# Patient Record
Sex: MALE | Race: ASIAN | Employment: UNEMPLOYED | URBAN - METROPOLITAN AREA
[De-identification: names, ages, dates, MRNs, and addresses within clinical notes are randomized per-mention and may not be internally consistent; named-entity substitution may affect disease eponyms.]

---

## 2017-01-11 ENCOUNTER — RECORDS - HEALTHEAST (OUTPATIENT)
Dept: LAB | Facility: CLINIC | Age: 57
End: 2017-01-11

## 2017-01-11 LAB
CHOLEST SERPL-MCNC: 255 MG/DL
FASTING STATUS PATIENT QL REPORTED: YES
HDLC SERPL-MCNC: 46 MG/DL
LDLC SERPL CALC-MCNC: 172 MG/DL
TRIGL SERPL-MCNC: 185 MG/DL

## 2017-04-11 ENCOUNTER — RECORDS - HEALTHEAST (OUTPATIENT)
Dept: LAB | Facility: CLINIC | Age: 57
End: 2017-04-11

## 2017-04-11 LAB
CHOLEST SERPL-MCNC: 277 MG/DL
FASTING STATUS PATIENT QL REPORTED: YES
HDLC SERPL-MCNC: 48 MG/DL
LDLC SERPL CALC-MCNC: 181 MG/DL
TRIGL SERPL-MCNC: 240 MG/DL

## 2018-04-05 ENCOUNTER — RECORDS - HEALTHEAST (OUTPATIENT)
Dept: LAB | Facility: CLINIC | Age: 58
End: 2018-04-05

## 2018-04-05 LAB
ALBUMIN SERPL-MCNC: 3.7 G/DL (ref 3.5–5)
ALP SERPL-CCNC: 91 U/L (ref 45–120)
ALT SERPL W P-5'-P-CCNC: 61 U/L (ref 0–45)
ANION GAP SERPL CALCULATED.3IONS-SCNC: 12 MMOL/L (ref 5–18)
AST SERPL W P-5'-P-CCNC: 29 U/L (ref 0–40)
BILIRUB SERPL-MCNC: 0.7 MG/DL (ref 0–1)
BUN SERPL-MCNC: 14 MG/DL (ref 8–22)
CALCIUM SERPL-MCNC: 9.8 MG/DL (ref 8.5–10.5)
CHLORIDE BLD-SCNC: 106 MMOL/L (ref 98–107)
CHOLEST SERPL-MCNC: 260 MG/DL
CO2 SERPL-SCNC: 22 MMOL/L (ref 22–31)
CREAT SERPL-MCNC: 1.1 MG/DL (ref 0.7–1.3)
FASTING STATUS PATIENT QL REPORTED: YES
GFR SERPL CREATININE-BSD FRML MDRD: >60 ML/MIN/1.73M2
GLUCOSE BLD-MCNC: 102 MG/DL (ref 70–125)
HDLC SERPL-MCNC: 42 MG/DL
LDLC SERPL CALC-MCNC: 161 MG/DL
POTASSIUM BLD-SCNC: 4.5 MMOL/L (ref 3.5–5)
PROT SERPL-MCNC: 7.3 G/DL (ref 6–8)
PSA SERPL-MCNC: 0.6 NG/ML (ref 0–3.5)
SODIUM SERPL-SCNC: 140 MMOL/L (ref 136–145)
TRIGL SERPL-MCNC: 284 MG/DL

## 2018-10-16 ENCOUNTER — RECORDS - HEALTHEAST (OUTPATIENT)
Dept: LAB | Facility: CLINIC | Age: 58
End: 2018-10-16

## 2018-10-16 LAB
CHOLEST SERPL-MCNC: 168 MG/DL
FASTING STATUS PATIENT QL REPORTED: YES
HDLC SERPL-MCNC: 43 MG/DL
LDLC SERPL CALC-MCNC: 109 MG/DL
TRIGL SERPL-MCNC: 80 MG/DL

## 2021-01-01 ENCOUNTER — RECORDS - HEALTHEAST (OUTPATIENT)
Dept: ADMINISTRATIVE | Facility: CLINIC | Age: 61
End: 2021-01-01

## 2022-01-01 ENCOUNTER — APPOINTMENT (OUTPATIENT)
Dept: CT IMAGING | Facility: HOSPITAL | Age: 62
End: 2022-01-01
Attending: EMERGENCY MEDICINE
Payer: MEDICARE

## 2022-01-01 ENCOUNTER — APPOINTMENT (OUTPATIENT)
Dept: GENERAL RADIOLOGY | Facility: CLINIC | Age: 62
DRG: 283 | End: 2022-01-01
Attending: EMERGENCY MEDICINE
Payer: MEDICARE

## 2022-01-01 ENCOUNTER — ANESTHESIA EVENT (OUTPATIENT)
Dept: SURGERY | Facility: CLINIC | Age: 62
DRG: 283 | End: 2022-01-01
Payer: MEDICARE

## 2022-01-01 ENCOUNTER — HOSPITAL ENCOUNTER (EMERGENCY)
Facility: HOSPITAL | Age: 62
Discharge: ANOTHER HOSPITAL | End: 2022-04-01
Attending: EMERGENCY MEDICINE | Admitting: EMERGENCY MEDICINE
Payer: MEDICARE

## 2022-01-01 ENCOUNTER — HOSPITAL ENCOUNTER (INPATIENT)
Facility: CLINIC | Age: 62
LOS: 1 days | DRG: 283 | End: 2022-04-02
Attending: EMERGENCY MEDICINE | Admitting: SURGERY
Payer: MEDICARE

## 2022-01-01 ENCOUNTER — ANESTHESIA (OUTPATIENT)
Dept: SURGERY | Facility: CLINIC | Age: 62
DRG: 283 | End: 2022-01-01
Payer: MEDICARE

## 2022-01-01 ENCOUNTER — ANESTHESIA EVENT (OUTPATIENT)
Dept: EMERGENCY MEDICINE | Facility: CLINIC | Age: 62
DRG: 283 | End: 2022-01-01
Payer: MEDICARE

## 2022-01-01 ENCOUNTER — ANESTHESIA (OUTPATIENT)
Dept: EMERGENCY MEDICINE | Facility: CLINIC | Age: 62
DRG: 283 | End: 2022-01-01
Payer: MEDICARE

## 2022-01-01 VITALS
WEIGHT: 140 LBS | SYSTOLIC BLOOD PRESSURE: 91 MMHG | DIASTOLIC BLOOD PRESSURE: 59 MMHG | OXYGEN SATURATION: 97 % | HEART RATE: 60 BPM | RESPIRATION RATE: 25 BRPM

## 2022-01-01 VITALS
WEIGHT: 139.99 LBS | RESPIRATION RATE: 18 BRPM | TEMPERATURE: 98.2 F | DIASTOLIC BLOOD PRESSURE: 22 MMHG | OXYGEN SATURATION: 93 % | SYSTOLIC BLOOD PRESSURE: 124 MMHG | HEART RATE: 57 BPM

## 2022-01-01 DIAGNOSIS — I71.019 DISSECTION OF THORACIC AORTA (H): ICD-10-CM

## 2022-01-01 DIAGNOSIS — I20.0 UNSTABLE ANGINA (H): ICD-10-CM

## 2022-01-01 DIAGNOSIS — J96.01 ACUTE RESPIRATORY FAILURE WITH HYPOXIA (H): ICD-10-CM

## 2022-01-01 DIAGNOSIS — R57.8: ICD-10-CM

## 2022-01-01 DIAGNOSIS — I21.4 NSTEMI (NON-ST ELEVATED MYOCARDIAL INFARCTION) (H): ICD-10-CM

## 2022-01-01 DIAGNOSIS — I31.39 PERICARDIAL EFFUSION: ICD-10-CM

## 2022-01-01 DIAGNOSIS — E87.20 LACTIC ACIDOSIS: ICD-10-CM

## 2022-01-01 LAB
ALBUMIN SERPL-MCNC: 3.2 G/DL (ref 3.4–5)
ALP SERPL-CCNC: 99 U/L (ref 40–150)
ALT SERPL W P-5'-P-CCNC: 139 U/L (ref 0–70)
ANION GAP SERPL CALCULATED.3IONS-SCNC: 10 MMOL/L (ref 3–14)
ANION GAP SERPL CALCULATED.3IONS-SCNC: 10 MMOL/L (ref 5–18)
APTT PPP: 43 SECONDS (ref 22–38)
AST SERPL W P-5'-P-CCNC: 146 U/L (ref 0–45)
BASOPHILS # BLD AUTO: 0.2 10E3/UL (ref 0–0.2)
BASOPHILS # BLD MANUAL: 0 10E3/UL (ref 0–0.2)
BASOPHILS NFR BLD AUTO: 1 %
BASOPHILS NFR BLD MANUAL: 0 %
BILIRUB SERPL-MCNC: 0.6 MG/DL (ref 0.2–1.3)
BNP SERPL-MCNC: 63 PG/ML (ref 0–56)
BUN SERPL-MCNC: 15 MG/DL (ref 8–22)
BUN SERPL-MCNC: 19 MG/DL (ref 7–30)
CA-I BLD-MCNC: 4.7 MG/DL (ref 4.4–5.2)
CA-I BLD-MCNC: >10 MG/DL (ref 4.4–5.2)
CALCIUM SERPL-MCNC: 6 MG/DL (ref 8.5–10.5)
CALCIUM SERPL-MCNC: 7.8 MG/DL (ref 8.5–10.1)
CHLORIDE BLD-SCNC: 105 MMOL/L (ref 94–109)
CHLORIDE BLD-SCNC: 113 MMOL/L (ref 98–107)
CO2 SERPL-SCNC: 16 MMOL/L (ref 22–31)
CO2 SERPL-SCNC: 23 MMOL/L (ref 20–32)
CPB POCT: NO
CPB POCT: NO
CREAT BLD-MCNC: 1.5 MG/DL (ref 0.7–1.3)
CREAT SERPL-MCNC: 1.11 MG/DL (ref 0.7–1.3)
CREAT SERPL-MCNC: 1.58 MG/DL (ref 0.66–1.25)
EOSINOPHIL # BLD AUTO: 0.2 10E3/UL (ref 0–0.7)
EOSINOPHIL # BLD MANUAL: 0 10E3/UL (ref 0–0.7)
EOSINOPHIL NFR BLD AUTO: 1 %
EOSINOPHIL NFR BLD MANUAL: 0 %
ERYTHROCYTE [DISTWIDTH] IN BLOOD BY AUTOMATED COUNT: 13.2 % (ref 10–15)
ERYTHROCYTE [DISTWIDTH] IN BLOOD BY AUTOMATED COUNT: 13.3 % (ref 10–15)
GFR SERPL CREATININE-BSD FRML MDRD: 49 ML/MIN/1.73M2
GFR SERPL CREATININE-BSD FRML MDRD: 52 ML/MIN/1.73M2
GFR SERPL CREATININE-BSD FRML MDRD: 75 ML/MIN/1.73M2
GLUCOSE BLD-MCNC: 143 MG/DL (ref 70–125)
GLUCOSE BLD-MCNC: 183 MG/DL (ref 70–99)
GLUCOSE BLD-MCNC: 198 MG/DL (ref 70–99)
GLUCOSE BLD-MCNC: 415 MG/DL (ref 70–99)
HCO3 BLDV-SCNC: 17 MMOL/L (ref 21–28)
HCO3 BLDV-SCNC: 24 MMOL/L (ref 21–28)
HCO3 BLDV-SCNC: 24 MMOL/L (ref 21–28)
HCT VFR BLD AUTO: 30.7 % (ref 40–53)
HCT VFR BLD AUTO: 43.7 % (ref 40–53)
HCT VFR BLD CALC: 36 % (ref 40–53)
HCT VFR BLD CALC: 43 % (ref 40–53)
HCV RNA SERPL NAA+PROBE-ACNC: NOT DETECTED IU/ML
HGB BLD-MCNC: 10.1 G/DL (ref 13.3–17.7)
HGB BLD-MCNC: 12.2 G/DL (ref 13.3–17.7)
HGB BLD-MCNC: 14 G/DL (ref 13.3–17.7)
HGB BLD-MCNC: 14.6 G/DL (ref 13.3–17.7)
HIV 1+2 AB+HIV1P24 AG SERPLBLD IA.RAPID: NON REACTIVE
HIV 1+2 AB+HIV1P24 AG SERPLBLD IA.RAPID: NON REACTIVE
HIV INTERPRETATION: NORMAL
HOLD SPECIMEN: NORMAL
IMM GRANULOCYTES # BLD: 0.9 10E3/UL
IMM GRANULOCYTES NFR BLD: 2 %
INR PPP: 1.31 (ref 0.85–1.15)
INR PPP: 1.58 (ref 0.9–1.15)
LACTATE BLD-SCNC: 4.3 MMOL/L
LYMPHOCYTES # BLD AUTO: 4.5 10E3/UL (ref 0.8–5.3)
LYMPHOCYTES # BLD MANUAL: 2.6 10E3/UL (ref 0.8–5.3)
LYMPHOCYTES NFR BLD AUTO: 11 %
LYMPHOCYTES NFR BLD MANUAL: 16 %
MAGNESIUM SERPL-MCNC: 1.5 MG/DL (ref 1.8–2.6)
MCH RBC QN AUTO: 30.3 PG (ref 26.5–33)
MCH RBC QN AUTO: 30.4 PG (ref 26.5–33)
MCHC RBC AUTO-ENTMCNC: 32 G/DL (ref 31.5–36.5)
MCHC RBC AUTO-ENTMCNC: 32.9 G/DL (ref 31.5–36.5)
MCV RBC AUTO: 92 FL (ref 78–100)
MCV RBC AUTO: 95 FL (ref 78–100)
MONOCYTES # BLD AUTO: 2.6 10E3/UL (ref 0–1.3)
MONOCYTES # BLD MANUAL: 0.2 10E3/UL (ref 0–1.3)
MONOCYTES NFR BLD AUTO: 7 %
MONOCYTES NFR BLD MANUAL: 1 %
NEUTROPHILS # BLD AUTO: 31.2 10E3/UL (ref 1.6–8.3)
NEUTROPHILS # BLD MANUAL: 13.6 10E3/UL (ref 1.6–8.3)
NEUTROPHILS NFR BLD AUTO: 78 %
NEUTROPHILS NFR BLD MANUAL: 83 %
NRBC # BLD AUTO: 0 10E3/UL
NRBC BLD AUTO-RTO: 0 /100
NT-PROBNP SERPL-MCNC: 510 PG/ML (ref 0–900)
PCO2 BLDV: 118 MM HG (ref 40–50)
PCO2 BLDV: 55 MM HG (ref 40–50)
PCO2 BLDV: 58 MM HG (ref 40–50)
PH BLDV: 6.78 [PH] (ref 7.32–7.43)
PH BLDV: 7.22 [PH] (ref 7.32–7.43)
PH BLDV: 7.25 [PH] (ref 7.32–7.43)
PLAT MORPH BLD: ABNORMAL
PLATELET # BLD AUTO: 125 10E3/UL (ref 150–450)
PLATELET # BLD AUTO: 184 10E3/UL (ref 150–450)
PO2 BLDV: 18 MM HG (ref 25–47)
PO2 BLDV: 21 MM HG (ref 25–47)
PO2 BLDV: 26 MM HG (ref 25–47)
POTASSIUM BLD-SCNC: 2.8 MMOL/L (ref 3.5–5)
POTASSIUM BLD-SCNC: 3.6 MMOL/L (ref 3.4–5.3)
POTASSIUM BLD-SCNC: 3.7 MMOL/L (ref 3.4–5.3)
POTASSIUM BLD-SCNC: 5.2 MMOL/L (ref 3.4–5.3)
PROT SERPL-MCNC: 6.5 G/DL (ref 6.8–8.8)
RBC # BLD AUTO: 3.33 10E6/UL (ref 4.4–5.9)
RBC # BLD AUTO: 4.61 10E6/UL (ref 4.4–5.9)
RBC MORPH BLD: ABNORMAL
SAO2 % BLDV: 16 % (ref 94–100)
SAO2 % BLDV: 19 % (ref 94–100)
SAO2 % BLDV: 27 % (ref 94–100)
SARS-COV-2 RNA RESP QL NAA+PROBE: NEGATIVE
SODIUM BLD-SCNC: 130 MMOL/L (ref 133–144)
SODIUM BLD-SCNC: 139 MMOL/L (ref 133–144)
SODIUM SERPL-SCNC: 138 MMOL/L (ref 133–144)
SODIUM SERPL-SCNC: 139 MMOL/L (ref 136–145)
TROPONIN I SERPL HS-MCNC: 408 NG/L
TROPONIN I SERPL-MCNC: 0.03 NG/ML (ref 0–0.29)
WBC # BLD AUTO: 16.4 10E3/UL (ref 4–11)
WBC # BLD AUTO: 39.6 10E3/UL (ref 4–11)

## 2022-01-01 PROCEDURE — 85610 PROTHROMBIN TIME: CPT | Performed by: EMERGENCY MEDICINE

## 2022-01-01 PROCEDURE — 0BH17EZ INSERTION OF ENDOTRACHEAL AIRWAY INTO TRACHEA, VIA NATURAL OR ARTIFICIAL OPENING: ICD-10-PCS | Performed by: PHYSICIAN ASSISTANT

## 2022-01-01 PROCEDURE — 3E043XZ INTRODUCTION OF VASOPRESSOR INTO CENTRAL VEIN, PERCUTANEOUS APPROACH: ICD-10-PCS | Performed by: EMERGENCY MEDICINE

## 2022-01-01 PROCEDURE — 36415 COLL VENOUS BLD VENIPUNCTURE: CPT | Performed by: EMERGENCY MEDICINE

## 2022-01-01 PROCEDURE — 87635 SARS-COV-2 COVID-19 AMP PRB: CPT | Performed by: EMERGENCY MEDICINE

## 2022-01-01 PROCEDURE — 999N000065 XR CHEST PORT 1 VIEW

## 2022-01-01 PROCEDURE — 99292 CRITICAL CARE ADDL 30 MIN: CPT | Mod: 25 | Performed by: EMERGENCY MEDICINE

## 2022-01-01 PROCEDURE — 36556 INSERT NON-TUNNEL CV CATH: CPT | Performed by: EMERGENCY MEDICINE

## 2022-01-01 PROCEDURE — 99291 CRITICAL CARE FIRST HOUR: CPT | Mod: 25 | Performed by: EMERGENCY MEDICINE

## 2022-01-01 PROCEDURE — 250N000013 HC RX MED GY IP 250 OP 250 PS 637: Performed by: EMERGENCY MEDICINE

## 2022-01-01 PROCEDURE — 250N000011 HC RX IP 250 OP 636: Performed by: EMERGENCY MEDICINE

## 2022-01-01 PROCEDURE — 82330 ASSAY OF CALCIUM: CPT

## 2022-01-01 PROCEDURE — 85027 COMPLETE CBC AUTOMATED: CPT | Performed by: EMERGENCY MEDICINE

## 2022-01-01 PROCEDURE — 258N000003 HC RX IP 258 OP 636: Performed by: EMERGENCY MEDICINE

## 2022-01-01 PROCEDURE — 96365 THER/PROPH/DIAG IV INF INIT: CPT | Performed by: EMERGENCY MEDICINE

## 2022-01-01 PROCEDURE — 83880 ASSAY OF NATRIURETIC PEPTIDE: CPT | Performed by: EMERGENCY MEDICINE

## 2022-01-01 PROCEDURE — 83735 ASSAY OF MAGNESIUM: CPT | Performed by: EMERGENCY MEDICINE

## 2022-01-01 PROCEDURE — 86901 BLOOD TYPING SEROLOGIC RH(D): CPT | Performed by: EMERGENCY MEDICINE

## 2022-01-01 PROCEDURE — 33016 PERICARDIOCENTESIS W/IMAGING: CPT | Performed by: EMERGENCY MEDICINE

## 2022-01-01 PROCEDURE — 99285 EMERGENCY DEPT VISIT HI MDM: CPT | Mod: 25 | Performed by: EMERGENCY MEDICINE

## 2022-01-01 PROCEDURE — 80053 COMPREHEN METABOLIC PANEL: CPT | Performed by: EMERGENCY MEDICINE

## 2022-01-01 PROCEDURE — 93308 TTE F-UP OR LMTD: CPT | Mod: 26 | Performed by: EMERGENCY MEDICINE

## 2022-01-01 PROCEDURE — 93005 ELECTROCARDIOGRAM TRACING: CPT | Performed by: EMERGENCY MEDICINE

## 2022-01-01 PROCEDURE — C9803 HOPD COVID-19 SPEC COLLECT: HCPCS

## 2022-01-01 PROCEDURE — 120N000001 HC R&B MED SURG/OB

## 2022-01-01 PROCEDURE — 96366 THER/PROPH/DIAG IV INF ADDON: CPT | Performed by: EMERGENCY MEDICINE

## 2022-01-01 PROCEDURE — 93308 TTE F-UP OR LMTD: CPT | Performed by: EMERGENCY MEDICINE

## 2022-01-01 PROCEDURE — 74174 CTA ABD&PLVS W/CONTRAST: CPT

## 2022-01-01 PROCEDURE — 82803 BLOOD GASES ANY COMBINATION: CPT

## 2022-01-01 PROCEDURE — 82947 ASSAY GLUCOSE BLOOD QUANT: CPT | Performed by: EMERGENCY MEDICINE

## 2022-01-01 PROCEDURE — 85730 THROMBOPLASTIN TIME PARTIAL: CPT | Performed by: EMERGENCY MEDICINE

## 2022-01-01 PROCEDURE — 71045 X-RAY EXAM CHEST 1 VIEW: CPT | Mod: 26 | Performed by: RADIOLOGY

## 2022-01-01 PROCEDURE — 82565 ASSAY OF CREATININE: CPT

## 2022-01-01 PROCEDURE — 84484 ASSAY OF TROPONIN QUANT: CPT | Performed by: EMERGENCY MEDICINE

## 2022-01-01 PROCEDURE — 250N000009 HC RX 250: Performed by: EMERGENCY MEDICINE

## 2022-01-01 PROCEDURE — 0W9D3ZZ DRAINAGE OF PERICARDIAL CAVITY, PERCUTANEOUS APPROACH: ICD-10-PCS | Performed by: EMERGENCY MEDICINE

## 2022-01-01 PROCEDURE — 370N000003 HC ANESTHESIA WARD SERVICE

## 2022-01-01 PROCEDURE — 99204 OFFICE O/P NEW MOD 45 MIN: CPT | Performed by: SURGERY

## 2022-01-01 PROCEDURE — 36556 INSERT NON-TUNNEL CV CATH: CPT | Mod: 59 | Performed by: EMERGENCY MEDICINE

## 2022-01-01 PROCEDURE — 99285 EMERGENCY DEPT VISIT HI MDM: CPT | Mod: 25

## 2022-01-01 PROCEDURE — 92960 CARDIOVERSION ELECTRIC EXT: CPT | Performed by: EMERGENCY MEDICINE

## 2022-01-01 PROCEDURE — 5A12012 PERFORMANCE OF CARDIAC OUTPUT, SINGLE, MANUAL: ICD-10-PCS | Performed by: EMERGENCY MEDICINE

## 2022-01-01 PROCEDURE — 85025 COMPLETE CBC W/AUTO DIFF WBC: CPT | Performed by: EMERGENCY MEDICINE

## 2022-01-01 PROCEDURE — 93010 ELECTROCARDIOGRAM REPORT: CPT | Mod: 59 | Performed by: EMERGENCY MEDICINE

## 2022-01-01 PROCEDURE — 96375 TX/PRO/DX INJ NEW DRUG ADDON: CPT | Performed by: EMERGENCY MEDICINE

## 2022-01-01 RX ORDER — FENTANYL CITRATE 50 UG/ML
25 INJECTION, SOLUTION INTRAMUSCULAR; INTRAVENOUS ONCE
Status: COMPLETED | OUTPATIENT
Start: 2022-01-01 | End: 2022-01-01

## 2022-01-01 RX ORDER — NICOTINE POLACRILEX 4 MG
15-30 LOZENGE BUCCAL
Status: CANCELLED | OUTPATIENT
Start: 2022-01-01

## 2022-01-01 RX ORDER — AMOXICILLIN 250 MG
1 CAPSULE ORAL 2 TIMES DAILY PRN
Status: CANCELLED | OUTPATIENT
Start: 2022-01-01

## 2022-01-01 RX ORDER — ONDANSETRON 2 MG/ML
4 INJECTION INTRAMUSCULAR; INTRAVENOUS EVERY 6 HOURS PRN
Status: CANCELLED | OUTPATIENT
Start: 2022-01-01

## 2022-01-01 RX ORDER — PROCHLORPERAZINE 25 MG
25 SUPPOSITORY, RECTAL RECTAL EVERY 12 HOURS PRN
Status: CANCELLED | OUTPATIENT
Start: 2022-01-01

## 2022-01-01 RX ORDER — METOCLOPRAMIDE HYDROCHLORIDE 5 MG/ML
10 INJECTION INTRAMUSCULAR; INTRAVENOUS ONCE
Status: COMPLETED | OUTPATIENT
Start: 2022-01-01 | End: 2022-01-01

## 2022-01-01 RX ORDER — PROCHLORPERAZINE MALEATE 10 MG
10 TABLET ORAL EVERY 6 HOURS PRN
Status: CANCELLED | OUTPATIENT
Start: 2022-01-01

## 2022-01-01 RX ORDER — NITROGLYCERIN 0.4 MG/1
0.4 TABLET SUBLINGUAL EVERY 5 MIN PRN
Status: DISCONTINUED | OUTPATIENT
Start: 2022-01-01 | End: 2022-01-01

## 2022-01-01 RX ORDER — HYDROMORPHONE HYDROCHLORIDE 1 MG/ML
.3-.5 INJECTION, SOLUTION INTRAMUSCULAR; INTRAVENOUS; SUBCUTANEOUS
Status: CANCELLED | OUTPATIENT
Start: 2022-01-01

## 2022-01-01 RX ORDER — MORPHINE SULFATE 4 MG/ML
4 INJECTION, SOLUTION INTRAMUSCULAR; INTRAVENOUS ONCE
Status: COMPLETED | OUTPATIENT
Start: 2022-01-01 | End: 2022-01-01

## 2022-01-01 RX ORDER — ACETAMINOPHEN 325 MG/1
650 TABLET ORAL EVERY 4 HOURS PRN
Status: CANCELLED | OUTPATIENT
Start: 2022-01-01

## 2022-01-01 RX ORDER — FIBRINOGEN (HUMAN) 700-1300MG
1050 KIT INTRAVENOUS ONCE
Status: DISCONTINUED | OUTPATIENT
Start: 2022-01-01 | End: 2022-01-01 | Stop reason: HOSPADM

## 2022-01-01 RX ORDER — ONDANSETRON 2 MG/ML
8 INJECTION INTRAMUSCULAR; INTRAVENOUS ONCE
Status: COMPLETED | OUTPATIENT
Start: 2022-01-01 | End: 2022-01-01

## 2022-01-01 RX ORDER — ONDANSETRON 4 MG/1
4 TABLET, ORALLY DISINTEGRATING ORAL EVERY 6 HOURS PRN
Status: CANCELLED | OUTPATIENT
Start: 2022-01-01

## 2022-01-01 RX ORDER — IOPAMIDOL 755 MG/ML
75 INJECTION, SOLUTION INTRAVASCULAR ONCE
Status: COMPLETED | OUTPATIENT
Start: 2022-01-01 | End: 2022-01-01

## 2022-01-01 RX ORDER — DEXTROSE MONOHYDRATE 25 G/50ML
25-50 INJECTION, SOLUTION INTRAVENOUS
Status: CANCELLED | OUTPATIENT
Start: 2022-01-01

## 2022-01-01 RX ORDER — EPINEPHRINE IN SOD CHLOR,ISO 1 MG/10 ML
SYRINGE (ML) INTRAVENOUS
Status: DISCONTINUED
Start: 2022-01-01 | End: 2022-01-01 | Stop reason: HOSPADM

## 2022-01-01 RX ORDER — AMOXICILLIN 250 MG
2 CAPSULE ORAL 2 TIMES DAILY PRN
Status: CANCELLED | OUTPATIENT
Start: 2022-01-01

## 2022-01-01 RX ORDER — ESMOLOL HYDROCHLORIDE 20 MG/ML
50-300 INJECTION, SOLUTION INTRAVENOUS CONTINUOUS
Status: DISCONTINUED | OUTPATIENT
Start: 2022-01-01 | End: 2022-01-01 | Stop reason: HOSPADM

## 2022-01-01 RX ORDER — NOREPINEPHRINE BITARTRATE 0.06 MG/ML
.01-.6 INJECTION, SOLUTION INTRAVENOUS CONTINUOUS
Status: DISCONTINUED | OUTPATIENT
Start: 2022-01-01 | End: 2022-01-01 | Stop reason: HOSPADM

## 2022-01-01 RX ADMIN — IOPAMIDOL 75 ML: 755 INJECTION, SOLUTION INTRAVENOUS at 21:39

## 2022-01-01 RX ADMIN — NITROGLYCERIN 0.4 MG: 0.4 TABLET SUBLINGUAL at 21:13

## 2022-01-01 RX ADMIN — Medication 0.03 MCG/KG/MIN: at 01:06

## 2022-01-01 RX ADMIN — ONDANSETRON 8 MG: 2 INJECTION INTRAMUSCULAR; INTRAVENOUS at 22:04

## 2022-01-01 RX ADMIN — SODIUM CHLORIDE 1000 ML: 9 INJECTION, SOLUTION INTRAVENOUS at 21:29

## 2022-01-01 RX ADMIN — FENTANYL CITRATE 25 MCG: 50 INJECTION INTRAMUSCULAR; INTRAVENOUS at 00:50

## 2022-01-01 RX ADMIN — METOCLOPRAMIDE HYDROCHLORIDE 10 MG: 5 INJECTION INTRAMUSCULAR; INTRAVENOUS at 22:24

## 2022-01-01 RX ADMIN — MORPHINE SULFATE 4 MG: 4 INJECTION, SOLUTION INTRAMUSCULAR; INTRAVENOUS at 21:13

## 2022-01-01 ASSESSMENT — ACTIVITIES OF DAILY LIVING (ADL)
ADLS_ACUITY_SCORE: 3

## 2022-01-01 ASSESSMENT — ENCOUNTER SYMPTOMS
CHEST TIGHTNESS: 1
DIZZINESS: 0
NUMBNESS: 0
WEAKNESS: 0

## 2022-04-02 PROBLEM — I31.39 PERICARDIAL EFFUSION: Status: ACTIVE | Noted: 2022-01-01

## 2022-04-02 PROBLEM — I21.4 NSTEMI (NON-ST ELEVATED MYOCARDIAL INFARCTION) (H): Status: ACTIVE | Noted: 2022-01-01

## 2022-04-02 PROBLEM — I71.019 DISSECTION OF THORACIC AORTA (H): Status: ACTIVE | Noted: 2022-01-01

## 2022-04-02 NOTE — ED PROVIDER NOTES
"EMERGENCY DEPARTMENT ENCOUNTER      NAME: Steven Buitrago  AGE: 61 year old male  YOB: 1960  MRN: [id number]  EVALUATION DATE & TIME: 4/1/2022  8:57 PM    PCP: No primary care provider on file.    ED PROVIDER: Tamy Willett M.D.      Chief Complaint   Patient presents with     Chest Pain     Shortness of Breath         FINAL IMPRESSION:  1. Unstable angina (H)    2. Dissection of thoracic aorta (H)          ED COURSE & MEDICAL DECISION MAKING:    ED Course as of 04/01/22 2225 Fri Apr 01, 2022 2112 Pt with very concerning chest pain with known CAD detected in 1996 initially and noncompliant with f/u with cardiology in the intervening 26 years, EKG with some J point depression in lateral leads not meeting STEMI criteria. Patient received ASA 81mg PO x4 by EMS thus no further ASA administered, NTG and morphine begun, no O2 as patient is not hypoxic. Will check troponin now stat, CTA r/o dissection and discuss with cardiology team, COVID19 PCR pending for admission and patient okay with plan.   2116 Note from 2021 refers to \"see historical medical record\" for further cath results from 1996, not scanned into EMR. Note from [facility] [place] refers to CAD in notes but no specifics.   2126 Pt became more diaphoretic and lightheaded with NSR on monitor without wide QRS with BP dipped to 80s for 2-3 minutes, now , pt with vagal event, repeat stat EKG pending, IVF begun and no NTG given, I updated charge RN and patient's RN is at bedside, pt moved to bed 4, CT aware and will stat CTA with POC creatinine now that BP improved, awaiting repeat EKG now   2133 Pt again diaphoretic, syncope. Now , awake again, stat to CT monitored, repeat KEG with more profound ST depressions V4-v6 and I and II without ST elevation, cardiology stat paged with dynamic changes and repeated ysncopal events.   2142 Pt with CTA revealing aortic dissection with me in CT with patient reading imaging, code aorta stat called by me in " [place] [facility] [place] now   2143 I called radiology Dr Olea [id number] who will review images the moment they show up on his screen as CT underway at this moment   2145 Dr Pierson from cardiothoracic surgery at [facility] and Dr Ramona Persaud from vascular surgery accept patient to [facility] and radiology Dr Olea confirms type A aortic dissection with pericardial effusion. Pt accepted to [facility] the [facility], Myla WELLER MD at [facility] accepts transfer also. Esmolol gtt to bedside in case HTN with goal SBP under 110   2152 Charge RN given EMTALA form, lights and sirens [facility] EMS en route to ED now to bring patient to [facility], esmolol gtt ordered to bedside to only be used to keep  and for transport use   2202 IR called to update me patient also with dissection of arch and descending aorta, I let them know pt is going to [facility] stat with Dr Little planning surgical intervention, EMS en route per charge RN, pt with nausea given zofran, stat pericardiocentesis equipment to bedside in case needed.   2203 RN gave signout to [facility] the [facility] and esmolol gtt to bedside for use as needed   2212 EMS delayed per charge RN, astill awaiting transport   2221 EMS now at bedside. Pt with another vagal episode briefly, SBP 89, lights and sirens transport EMS now to [facility] the [facility], esmolol at bedside and avaialble       Pertinent Labs & Imaging studies reviewed. (See chart for details)    N95 worn  A face shield was worn also  COVID PPE      At the conclusion of the encounter I discussed the results of all of the tests and the disposition. The questions were answered. The patient or family acknowledged understanding and was agreeable with the care plan.     Critical Care time was 70 minutes for this patient excluding procedures.      MEDICATIONS GIVEN IN THE EMERGENCY:  Medications   esmolol 20 mg/mL in sodium chloride 0.9% infusion (has no administration in time range)   morphine (PF) injection 4 mg (4 mg Intravenous Given 4/1/22 2113)  "  0.9% sodium chloride BOLUS (1,000 mLs Intravenous New Bag 4/1/22 2129)   iopamidol (ISOVUE-370) solution 75 mL (75 mLs Intravenous Given 4/1/22 2139)   ondansetron (ZOFRAN) injection 8 mg (8 mg Intravenous Given 4/1/22 2204)   metoclopramide (REGLAN) injection 10 mg (10 mg Intravenous Given 4/1/22 2224)       NEW PRESCRIPTIONS STARTED AT TODAY'S ER VISIT  New Prescriptions    No medications on file          =================================================================    HPI      Steven Buitrago is a 61 year old male with PMHx of obesity, HTN, HLD and CAD discovered on cardiac catheterization \"in 1996 and the doctor told me my coronary arteries were blocked and I needed to have it fixed\" without stents or CABG or f/u with cardiology, + active smoker who presents to the ED today via EMS with sudden onset chest pain 0830 while sitting, midsternal radiating towards his back, 8/10, dull, no medications taken. He called EMS who performed EKG and found ST depressions V4-v6 without ST elevations elsewhere, administered 4x ASA 81mg PO and one dose sublingual nitroglycerin with pain improvement to 5/10 and transported patient to ED. + diaphoresis and slight shortness of breath with deep breaths. No leg swelling. No family history of heart disease. He is not taking medications for HTN and HLD because he could not afford his medications. He does not have a known history of diabetes mellitus, FSG per .     No cough, no fever, no abdominal pain or nausea/vomiting/diarrhea, no trauma/falls, no leg swelling. No pain medication taken by him, no prior episodes.      REVIEW OF SYSTEMS   All other systems reviewed and are negative except as noted above in HPI.    PAST MEDICAL HISTORY:  No past medical history on file.    PAST SURGICAL HISTORY:  No past surgical history on file.    CURRENT MEDICATIONS:    No current outpatient medications on file.      ALLERGIES:  Not on File    FAMILY HISTORY:  No family history on " file.    SOCIAL HISTORY:   Social History     Socioeconomic History     Marital status: Single     Spouse name: Not on file     Number of children: Not on file     Years of education: Not on file     Highest education level: Not on file   Occupational History     Not on file   Tobacco Use     Smoking status: Not on file     Smokeless tobacco: Not on file   Substance and Sexual Activity     Alcohol use: Not on file     Drug use: Not on file     Sexual activity: Not on file   Other Topics Concern     Not on file   Social History Narrative     Not on file     Social Determinants of Health     Financial Resource Strain: Not on file   Food Insecurity: Not on file   Transportation Needs: Not on file   Physical Activity: Not on file   Stress: Not on file   Social Connections: Not on file   Intimate Partner Violence: Not on file   Housing Stability: Not on file       VITALS:  Patient Vitals for the past 24 hrs:   BP Pulse Resp SpO2 Weight   04/01/22 2215 91/59 60 25 97 % --   04/01/22 2200 98/54 63 19 95 % --   04/01/22 2155 -- -- -- -- 63.5 kg (140 lb)   04/01/22 2145 105/59 72 (!) 32 92 % --   04/01/22 2140 (!) 81/47 82 -- 90 % --   04/01/22 2130 94/65 -- -- -- --   04/01/22 2120 126/62 87 26 91 % --   04/01/22 2115 (!) 83/49 72 23 97 % --   04/01/22 2110 95/57 80 23 98 % --       PHYSICAL EXAM    GENERAL: Awake, alert.  Slightly diaphoretic appearing  HEENT: Normocephalic, atraumatic.  Pupils equal, round and reactive.  Conjunctiva normal.  EOMI.  NECK: No stridor or apparent deformity.  PULMONARY: Symmetrical breath sounds without distress.  Lungs clear to auscultation bilaterally without wheezes, rhonchi or rales.  CARDIO: Regular rate and rhythm.  No significant murmur, rub or gallop.  Radial pulses strong and symmetrical.  ABDOMINAL: Abdomen soft, non-distended and non-tender to palpation.  No CVAT, no palpable hepatosplenomegaly.  EXTREMITIES: No lower extremity swelling or edema.    NEURO: Alert and oriented to  person, place and time.  Cranial nerves grossly intact.  No focal motor deficit.  PSYCH: Normal mood and affect  SKIN: No rashes      LAB:  All pertinent labs reviewed and interpreted.  Results for orders placed or performed during the hospital encounter of 04/01/22   CTA Chest Abdomen Pelvis w Contrast    Impression    IMPRESSION:  1.  Aortic dissection involving the ascending aorta, aortic arch and descending thoracoabdominal aorta to the level of the inferior mesenteric artery. Patent great vessels and visceral branches which arise from the true lumen. There is diminished   parenchymal enhancement of the right kidney, likely related to compression of the right renal artery ostium by the false lumen. Mild to moderate hemopericardium.    The findings were discussed with Dr. Willett on 04/01/2022 at 10:00 PM.   Extra Blue Top Tube   Result Value Ref Range    Hold Specimen JIC    Extra Red Top Tube   Result Value Ref Range    Hold Specimen JIC    Extra Green Top (Lithium Heparin) Tube   Result Value Ref Range    Hold Specimen JIC    Extra Purple Top Tube   Result Value Ref Range    Hold Specimen JIC    Result Value Ref Range    INR 1.58 (H) 0.90 - 1.15   Creatinine POCT   Result Value Ref Range    Creatinine POCT 1.5 (H) 0.7 - 1.3 mg/dL    GFR, ESTIMATED POCT 52 (L) >60 mL/min/1.73m2       RADIOLOGY:  Reviewed all pertinent imaging. Please see official radiology report.  CTA Chest Abdomen Pelvis w Contrast   Final Result   IMPRESSION:   1.  Aortic dissection involving the ascending aorta, aortic arch and descending thoracoabdominal aorta to the level of the inferior mesenteric artery. Patent great vessels and visceral branches which arise from the true lumen. There is diminished    parenchymal enhancement of the right kidney, likely related to compression of the right renal artery ostium by the false lumen. Mild to moderate hemopericardium.      The findings were discussed with Dr. Willett on 04/01/2022 at 10:00 PM.             EKG:    Reviewed and interpreted as: 2105 NSR with slight J point depression V4-v6, HR 80, ST depression new compared to prior EKG from 5/10/2007    2127 sinus rhythm HR 79 with ST depression I, II and V4-v6 without scant ST elevation V1 and V2 without full 1mm ST elevation in both V1 and V2      I have independently reviewed and interpreted the EKG(s) documented above.               Tamy Willett MD  04/01/22 [id number]

## 2022-04-02 NOTE — ANESTHESIA PROCEDURE NOTES
Airway       Patient location during procedure: ED       Procedure Start/Stop Times: 4/2/2022 1:44 AM  Staff -        CRNA: Margo Casas APRN CRNA       Performed By: CRNAIndications and Patient Condition       Indications for airway management: cardiovascular arrest       Mallampati: Not Assessed     Induction type:other (comments) (Code blue/Ryan/ no drugs)       Mask difficulty assessment: 0 - not attempted    Final Airway Details       Final airway type: endotracheal airway       Successful airway: ETT - single  Endotracheal Airway Details        ETT size (mm): 6.0       Cuffed: yes       Successful intubation technique: video laryngoscopy       VL Blade Size: MAC D Blade       Grade View of Cords: 2       Adjucts: stylet       Position: Right       Measured from: gums/teeth       Secured at (cm): 23       Bite block used: None    Post intubation assessment        ETT secured, Vent settings by primary/ICU team, Primary/ICU team to review CXR, Sedation to be ordered by primary/ICU team and No apparent complications       Placement verified by: capnometry, equal breath sounds and chest rise        Number of attempts at approach: 1       Number of other approaches attempted: 0       Secured with: commercial tube burns       Ease of procedure: easy       Dentition: Intact and Unchanged    Additional Comments       Responded to an anesthesia stat in the ED in a patient with a known dissecting aortic aneurysm that had refused surgery earlier in the evening.  ED physician had made multiple attempts with a CMAC to intubate including using a smaller tube and a bougie - unable to visualize cords and gastric contents were present..  Currently an I-gel had been placed with positive ventilation.  Back up CRNA called and anesthesiologist notified.  Able to achieve a grade 2 view with the D-blade.  A 6.0 tube was passed under direct visualization with the Ryan paused.  Positive end tidal as well.  Pt was asystole  with the JOSÉ ANTONIO paused - vitals per ER record.

## 2022-04-02 NOTE — ED TRIAGE NOTES
Pt presents to ED via EMS transfer from [facility] c/o chest pain that radiates to mid back. Pt dx with AAA dissection. Pt here for Emergency surgery.

## 2022-04-02 NOTE — ED PROVIDER NOTES
ED Provider Note  [facility]      History     Chief Complaint   Patient presents with     Chest Pain     The history is provided by the patient, medical records and the EMS personnel.     Steven Buitrago is a 61 year old male with history of CAD, HTN, HLD presenting to the ED via EMS from [facility] for further evaluation of aortic dissection with pericardial effusion. Per EMS patient with chest pain onset at 5:30 pm. He called 911 and was given aspirin and nitroglycerin en route to [facility]. 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 Patient very nauseous and dry heaving with multiple vagal episodes prior to arrival. He was gien 8 mg Zofran and 10 mg Reglan prior to transfer. BP in the 100s systolic en route. Sats in the mid 80s on RA, improved to mid 90s on 6L on arrival. No esmolol was started prior to transfer due to hypotension. Upon arrival patient endorses chest tightness. No chest pain. He denies numbness, weakness, dizziness, and vision changes.     Per chart review patient arrived to [facility] ED with report of sudden onset chest pain 0830 while sitting, midsternal radiating towards his back, 8/10, dull, no medications taken. He called EMS who performed EKG and found ST depressions V4-v6 without ST elevations elsewhere, administered 4x ASA 81mg PO and one dose sublingual nitroglycerin with pain improvement to 5/10 and transported patient to ED. Diaphoretic with shortness of breath on deep inspiration. NTG and morphine begun, no O2 as patient was not hypoxic. Patient became more diaphoretic and lightheaded with NSR on monitor w/o wide QRS w/ BP dipped to 80s for 2-3 min, then  with vagal event. IVF begun. Repeat EKG with more profound ST depressions V4-V6 and I and II w/o ST elevation. CTA revealing aortic dissection. Radiology confirmed Type A dissection w/ pericardial effusion. Esmolol gtt to bedside in case HTN with goal SBP under 110. Patient  accepted by Dr. Pierson from Cardiothoracic Surgery and Dr. Ramona Persaud from vascular surgery at [facility].     CTA Chest Abdomen Pelvis w Contrast   Final Result   IMPRESSION:   1.  Aortic dissection involving the ascending aorta, aortic arch and descending thoracoabdominal aorta to the level of the inferior mesenteric artery. Patent great vessels and visceral branches which arise from the true lumen. There is diminished    parenchymal enhancement of the right kidney, likely related to compression of the right renal artery ostium by the false lumen. Mild to moderate hemopericardium.     Past Medical History  Past Medical History:   Diagnosis Date     Diabetes (H)      History reviewed. No pertinent surgical history.  No current outpatient medications on file.    No Known Allergies  Family History  History reviewed. No pertinent family history.  Social History   Social History     Tobacco Use     Smoking status: Current Every Day Smoker     Smokeless tobacco: Never Used   Substance Use Topics     Alcohol use: Not Currently     Drug use: Never      Past medical history, past surgical history, medications, allergies, family history, and social history were reviewed with the patient. No additional pertinent items.       Review of Systems   Eyes: Negative for visual disturbance.   Respiratory: Positive for chest tightness.    Cardiovascular: Negative for chest pain.   Neurological: Negative for dizziness, weakness and numbness.     A complete review of systems was performed with pertinent positives and negatives noted in the HPI, and all other systems negative.    Physical Exam   BP: 95/60  Pulse: 60  Temp: 98.2  F (36.8  C)  Resp: 18  Weight: 63.5 kg (139 lb 15.9 oz)  SpO2: 95 %  Physical Exam  General: patient is alert and oriented, in moderate distress  Head: atraumatic and normocephalic   EENT: tacky mucus membranes, pupils 2mm equal round and reactive, EOMI  Neck: supple   Cardiovascular: regular rate and rhythm, no  murmur appreciated, extremities cool and diaphoretic, 1+ radial and and PT pulses bilaterally, no lower extremity edema  Pulmonary: lungs clear to auscultation bilaterally   Abdomen: soft, non-tender   Musculoskeletal: normal range of motion   Neurological: alert and oriented, moving all extremities symmetrically, CN II-XII intact, strength 5/5 and symmetric in , knee flexion/extension and ankle plantar/dorsiflexion, sensation to light touch in distal upper and lower extremities intact  Skin: cool, diaphoretic      ED Course      [facility]    -Central Line    Date/Time: 4/2/2022 2:19 AM  Performed by: Myla Burns MD  Authorized by: Myla Burns MD     Risks, benefits and alternatives discussed.      PRE-PROCEDURE DETAILS:     Hand hygiene: Hand hygiene performed prior to insertion      Sterile barrier technique: All elements of maximal sterile technique followed      Skin preparation:  ChloraPrep    Skin preparation agent: Skin preparation agent completely dried prior to procedure         ANESTHESIA    Anesthesia: Local infiltration  Local Anesthetic:  Lidocaine 1% without epinephrine  Anesthetic Total (mL):  10    PROCEDURE DETAILS:     Location:  R internal jugular    Patient position:  Trendelenburg    Procedural supplies:  Cordis and triple lumen    Landmarks identified: yes      Ultrasound guidance: yes      Sterile ultrasound techniques: Sterile gel and sterile probe covers were used      Number of attempts:  1    Successful placement: yes      POST PROCEDURE DETAILS:      Post-procedure:  Dressing applied and line sutured    Assessment:  Blood return through all ports, no pneumothorax on x-ray, free fluid flow and placement verified by x-ray    PROCEDURE    Patient Tolerance:  [facility] the [facility] well [facility]    Procedure:  Pericardiocentesis    Date/Time: 4/2/2022 2:21 AM  Performed by: Myla Burns MD  Authorized by: Myla Burns MD     Emergent condition/consent implied      PROCEDURE  Describe Procedure: Pericardiocentesis completed with pericardial effusion with PEA arrest.  Subxiphoid approach with 8F catheter and 60 ml blood removed.  Completed under ultrasound guidance with improved in effusion.  [facility]    -Intubation    Date/Time: 4/2/2022 2:24 AM  Performed by: Myla Burns MD  Authorized by: Myla Burns MD     Emergent condition/consent implied      PRE-PROCEDURE DETAILS     Patient status:  Altered mental status    Mallampati score:  III    Pretreatment medications:  None    Paralytics:  Rocuronium      PROCEDURE DETAILS     Preoxygenation:  Bag valve mask    CPR in progress: no      Intubation method:  Oral    Oral intubation technique:  Video-assisted    Laryngoscope blade:  Mac 4    Tube size (mm):  7.5    Tube type:  Cuffed    Number of attempts:  2    Ventilation between attempts: yes      Cricoid pressure: yes      Tube visualized through cords: no    PLACEMENT ASSESSMENT     Tube secured with:  ETT burns    PROCEDURE  Describe Procedure: Unable to pass 7.5 ETT.  Attempted passage of 7.0 without complete visulaization of cords.  Initially colorimetry change and fogging in tube but patient became more hypoxic with some gastric secretions in tube and tube pulled, igel was placed with oxygenation to 95%.  Anesthesia called and placed 5.0 ETT.    Patient Tolerance:  Patient tolerated the procedure well with no immediate complications              EKG Interpretation:      Interpreted by Myla Burns MD  Time reviewed: 2252  Symptoms at time of EKG: chest pain   Rhythm: normal sinus   Rate: Normal  Axis: Normal  Ectopy: none  Conduction: normal  ST Segments/ T Waves: ST Segment Depression I, aVL, V4, V5 and V6  Q Waves: III and  aVf  Comparison to prior: No old EKG available    Clinical Impression: myocardial ischemia              Critical Care Addendum    My initial assessment, based on my review of prehospital provider report, review of nursing observations, review of vital signs, focused history, physical exam, review of cardiac rhythm monitor, 12 lead ECG analysis and discussion with cardiothoracic surgery, established that Steven Buitrago has respiratory insufficiency, severe hypotension and suspicion of thoracic dissection, which requires immediate intervention, and therefore he is critically ill.     After the initial assessment, the care team initiated multiple lab tests, initiated IV fluid administration, initiated medication therapy with norepinephrine, achieved central venous access and performed advanced airway management to provide stabilization care. Due to the critical nature of this patient, I reassessed nursing observations, vital signs, physical exam, review of cardiac rhythm monitor, mental status, neurologic status and respiratory status multiple times prior to his disposition.     Time also spent performing documentation, discussion with family to obtain medical information for decision making, reviewing test results, discussion with consultants and coordination of care.     Critical care time (excluding teaching time and procedures): 90 minutes.               Results for orders placed or performed during the hospital encounter of 04/01/22   [place] Draw *Canceled*     Status: None ()    Narrative    The following orders were created for panel order [place] Draw.  Procedure                               Abnormality         Status                     ---------                               -----------         ------                       Please view results for these tests on the individual orders.   Results for orders placed or performed during the hospital encounter of 04/01/22   CTA Chest Abdomen Pelvis w Contrast      Status: None    Narrative    EXAM: CTA CHEST ABDOMEN PELVIS W CONTRAST  LOCATION: [facility]  DATE/TIME: 4/1/2022 9:38 PM    INDICATION: Chest pain, suspected aortic dissection  COMPARISON: None.  TECHNIQUE: CT angiogram chest abdomen pelvis during arterial phase of injection of IV contrast. 2D and 3D MIP reconstructions were performed by the CT technologist. Dose reduction techniques were used.   CONTRAST: isovue 370  75ml    FINDINGS:   CT ANGIOGRAM CHEST, ABDOMEN, AND PELVIS: Aortic dissection involving the ascending aorta, aortic arch and descending thoracoabdominal aorta. There is filling of both coronary arteries. Classic branching pattern of the aortic arch. The visualized great   vessels are patent and arise from the true lumen. There is mild to moderate hemorrhage within the pericardium. Contrast opacifies the false lumen in the proximal thoracic aorta. The dissection flap extends to the level of the inferior mesenteric artery.   Patent visceral branches which all arise from the true lumen. There is diminished enhancement of the right kidney when compared to the left which may reflect compression of the right renal artery ostium by the false lumen. Moderate stenosis of the left   renal artery ostium.    Bilateral common iliac, internal iliac and external iliac arteries. Patent bilateral common femoral arteries.    LUNGS AND PLEURA: Normal.    AXILLAE: Normal.    CORONARY ARTERY CALCIFICATION: Moderate.    HEPATOBILIARY: Normal.    PANCREAS: Normal.    SPLEEN: Normal.    ADRENAL GLANDS: Normal.    KIDNEYS/BLADDER: Diminished right renal parenchymal enhancement.    BOWEL: Normal.    LYMPH NODES: Normal.    PELVIC ORGANS: Normal.    MUSCULOSKELETAL: Normal.      Impression    IMPRESSION:  1.  Aortic dissection involving the ascending aorta, aortic arch and descending thoracoabdominal aorta to the level of the inferior mesenteric artery. Patent great vessels and visceral branches which  arise from the true lumen. There is diminished   parenchymal enhancement of the right kidney, likely related to compression of the right renal artery ostium by the false lumen. Mild to moderate hemopericardium.    The findings were discussed with Dr. Willett on 04/01/2022 at 10:00 PM.   [place] Draw     Status: None    Narrative    The following orders were created for panel order [place] Draw.  Procedure                               Abnormality         Status                     ---------                               -----------         ------                     Extra Blue Top Tube[[id number]]                              Final result               Extra Red Top Tube[[id number]]                               Final result               Extra Green Top (Lithium...[[id number]]                      Final result               Extra Purple Top Tube[[id number]]                            Final result               Extra Green Top (Lithium...[[id number]]                      Final result                 Please view results for these tests on the individual orders.   Extra Blue Top Tube     Status: None   Result Value Ref Range    Hold Specimen JIC    Extra Red Top Tube     Status: None   Result Value Ref Range    Hold Specimen JIC    Extra Green Top (Lithium Heparin) Tube     Status: None   Result Value Ref Range    Hold Specimen JIC    Extra Purple Top Tube     Status: None   Result Value Ref Range    Hold Specimen JIC    Extra Green Top (Lithium Heparin) ON ICE     Status: None   Result Value Ref Range    Hold Specimen JIC    Basic metabolic panel     Status: Abnormal   Result Value Ref Range    Sodium 139 136 - 145 mmol/L    Potassium 2.8 (LL) 3.5 - 5.0 mmol/L    Chloride 113 (H) 98 - 107 mmol/L    Carbon Dioxide (CO2) 16 (L) 22 - 31 mmol/L    Anion Gap 10 5 - 18 mmol/L    Urea Nitrogen 15 8 - 22 mg/dL    Creatinine 1.11 0.70 - 1.30 mg/dL    Calcium 6.0 (L) 8.5 - 10.5 mg/dL    Glucose 143 (H) 70 - 125 mg/dL    GFR Estimate  75 >60 mL/min/1.73m2   INR     Status: Abnormal   Result Value Ref Range    INR 1.58 (H) 0.90 - 1.15   Troponin I     Status: Normal   Result Value Ref Range    Troponin I 0.03 0.00 - 0.29 ng/mL   Magnesium     Status: Abnormal   Result Value Ref Range    Magnesium 1.5 (L) 1.8 - 2.6 mg/dL   B-Type Natriuretic Peptide ([facility] Only)     Status: Abnormal   Result Value Ref Range    BNP 63 (H) 0 - 56 pg/mL   Creatinine POCT     Status: Abnormal   Result Value Ref Range    Creatinine POCT 1.5 (H) 0.7 - 1.3 mg/dL    GFR, ESTIMATED POCT 52 (L) >60 mL/min/1.73m2   CBC with platelets and differential     Status: Abnormal (Preliminary result)   Result Value Ref Range    WBC Count 16.4 (H) 4.0 - 11.0 10e3/uL    RBC Count 3.33 (L) 4.40 - 5.90 10e6/uL    Hemoglobin 10.1 (L) 13.3 - 17.7 g/dL    Hematocrit 30.7 (L) 40.0 - 53.0 %    MCV 92 78 - 100 fL    MCH 30.3 26.5 - 33.0 pg    MCHC 32.9 31.5 - 36.5 g/dL    RDW 13.2 10.0 - 15.0 %    Platelet Count 125 (L) 150 - 450 10e3/uL   CBC with platelets differential     Status: Abnormal (In process)    Narrative    The following orders were created for panel order CBC with platelets differential.  Procedure                               Abnormality         Status                     ---------                               -----------         ------                     CBC with platelets and d...[[id number]]  Abnormal            Preliminary result           Please view results for these tests on the individual orders.     Medications   fibrinogen concentrate (Human) (FIBRYGA) injection 1,050 mg (has no administration in time range)   fibrinogen concentrate (Human) (FIBRYGA) injection 1,050 mg (has no administration in time range)   prothrombin 4 factor complex concentrate (KCENTRA) infusion 1,120 Units (has no administration in time range)   prothrombin 4 factor complex concentrate (KCENTRA) infusion 1,120 Units (has no administration in time range)        Assessments & Plan (with Medical  Decision Making)   Mr. Buitrago is a 61 year old male with history of CAD, HTN, HLD presenting to the ED via EMS from [facility] for further evaluation of aortic dissection with pericardial effusion.  Upon arrival patient was noted to be hypoxic and placed on oximask with saturations in the mid 90s.  He is hypotensive and was given IVF maintaining a MAP above 65.  Cardiothoracic surgery and anesthesia were available at the bedside shortly after arrival.  Surgery did have a prolonged discussion with both the patient and multiple family members and the patient declined surgical intervention.  I did additionally have this discussion with the patient using a E-Sign  and noted the risk of death, stroke, loss of limb, bowel ischemia, liver, renal and cardiac injury.  He is able to verbalize understanding of these risks but does not want to proceed at this time.  He reports he would like to wait until tomorrow and review again with his family.  I did encourage him that potentially he may not have enough time to wait until tomorrow and that he could die overnight if not intervened on immediately.  He is aware of this risk and continued to decline.   He continued to become more hypotensive with maps down into the 30s.  A central line was placed with initiation on Levophed.  He then became hypoxic down to 80% with agonal respirations and was unresponsive.  Endotracheal intubation was attempted however unable to successfully pass the ET tube.  An LMA was placed and was appropriately oxygenating and ventilating through the LMA.  Anesthesia was called and was able to place a 5.0 tube at the bedside.  During the intubation he became bradycardic and went into PEA arrest.  Chest compressions were immediately started and he was placed on the Ryan.  Repeat labs were obtained with a pH of 6.77 and PCO2 of 112.  He was given 3 amps of bicarb and epinephrine.  He was appropriately oxygenating through the ET tube.  Bilateral lung  sliding signs were present without evidence of pneumothorax.  He does have a persistent pericardial effusion and bedside pericardiocentesis was completed with 60 mils of blood returned and improvement in the pericardial effusion.  He did not have ROSC and on reevaluation was in cardiac standstill following 45 minutes of resuscitation.  At this point the code was terminated.  Patient  at 0156.        I have reviewed the nursing notes. I have reviewed the findings, diagnosis, plan and need for follow up with the patient.    New Prescriptions    No medications on file       Final diagnoses:   Dissection of thoracic aorta (H)   Pericardial effusion   NSTEMI (non-ST elevated myocardial infarction) (H)   Acute respiratory failure with hypoxia (H)   Lactic acidosis   Obstructive cardiovascular shock (H)   I, Darline Pemberton, am serving as a trained medical scribe to document services personally performed by Myla Hill MD, based on the provider's statements to me.     I, Myla Hill MD, was physically present and have reviewed and verified the accuracy of this note documented by Darline Pemberton.      --  Myla Hill  [facility] EMERGENCY DEPARTMENT  2022     Myla Hill MD  [phone]       Myla Hill MD  [phone]

## 2022-04-02 NOTE — CONSULTS
Cardiothoracic Surgery Consult    Date of Service: 4/2/2022    REASON FOR CONSULTATION: Type A aortic dissection      HISTORY OF PRESENT ILLNESS: Mr. Buitrago is a 61 year old male with untreated hypertension and long standing coronary artery disease since the 90s. He was emergently transferred from [facility] after diagnosing Type A dissection. He presented to ER with chest pain at rest. He was found to have EKG changes with ST depressions. CT showed type A dissection involving ascending aorta, arch and descending aorta. He has shortness of breath. Denies any headaches, weakness in extremities      IMPRESSION AND PLAN: Mr. Buitrago is a 61 year old with type A dissection presenting with cardiogenic shock and coagulopathy with untreated hypertension and coronary artery disease.     I had a thorough discussion with patient in the ER after emergent transport about his diagnosis. I discussed the surgical treatment plan, medical plan and their respective outcomes. I specifically discussed the natural history of dissections and the risk of death, stroke, loss of limbs and non-survivable bleeding.     After this discussion, he specifically stated that he does not want surgery multiple times. I asked if it was okay to discuss this his next of kin. I called his cousin and his nephew and discussed the same options. They agreed to respect his wishes of not undergoing life saving surgery.    Mr. Buitrago is clearly able to make his own decisions and is competent to understand these risks and benefits. We used an  as well.    I will plan on admitting him to MICU to impulse and pain control to limit his dissection. He understands that this process may still be fatal with optimal medical management.    I further discussed this case with Dr. Mcdaniel and Dr. Fong at length.    I did not discuss code status at this time.    Anitha Pierson MD   Cardiothoracic Surgery  P: [phone]  C: [phone]      PAST MEDICAL HISTORY:    Past Medical History:   Diagnosis Date     Diabetes (H)        PAST SURGICAL HISTORY: History reviewed. No pertinent surgical history.    ALLERGIES:   No Known Allergies     CURRENT MEDICATIONS:  Current Facility-Administered Medications   Medication     fibrinogen concentrate (Human) (FIBRYGA) injection 1,050 mg     fibrinogen concentrate (Human) (FIBRYGA) injection 1,050 mg     prothrombin 4 factor complex concentrate (KCENTRA) infusion 1,120 Units     prothrombin 4 factor complex concentrate (KCENTRA) infusion 1,120 Units     No current outpatient medications on file.         FAMILY HISTORY: History reviewed. No pertinent family history.  The family history was reviewed and is not pertinent to the patient's disease/illness      SOCIAL HISTORY:   Social History     Socioeconomic History     Marital status: Single     Spouse name: Not on file     Number of children: Not on file     Years of education: Not on file     Highest education level: Not on file   Occupational History     Not on file   Tobacco Use     Smoking status: Current Every Day Smoker     Smokeless tobacco: Never Used   Substance and Sexual Activity     Alcohol use: Not Currently     Drug use: Never     Sexual activity: Not on file   Other Topics Concern     Not on file   Social History Narrative     Not on file     Social Determinants of Health     Financial Resource Strain: Not on file   Food Insecurity: Not on file   Transportation Needs: Not on file   Physical Activity: Not on file   Stress: Not on file   Social Connections: Not on file   Intimate Partner Violence: Not on file   Housing Stability: Not on file         REVIEW OF SYSTEMS:  Review of Systems - chest pain  A complete 10 point review of systems was obtained and is negative other than the above stated complaints    PHYSICAL EXAMINATION:   Vitals: BP (S) (!) 115/92   Pulse 86   Temp 98.2  F (36.8  C) (Oral)   Resp 18   Wt 63.5 kg (139 lb 15.9 oz)   SpO2 98%   GENERAL:   Diaphoretic, restless  NECK: no JVD  CARDIOVASCULAR: hypotensive in 80s  RESPIRATIONS: tachypnic  ABDOMEN: soft, non distended and non tender  EXTREMITIES: cold but palpable pulses  NEUROLOGIC: intact and symmetric with no focal deficits.   PSYCHIATRIC: alert and oriented, anxious    LABORATORY STUDIES:   Lab Results   Component Value Date    WBC 39.6 (H) 04/01/2022    HGB 14.6 04/01/2022    HGB 14.0 04/01/2022    HCT 43 04/01/2022    HCT 43.7 04/01/2022    MCV 95 04/01/2022     04/01/2022      Lab Results   Component Value Date    BUN 19 04/01/2022     04/01/2022     04/01/2022    CO2 23 04/01/2022

## 2022-04-02 NOTE — PROGRESS NOTES
Cardiothoracic Surgery Staff Note    After my initial evaluation, I discussed his case with MICU to plan for impulse and pain control. While awaiting transfer, his MAPS were dropping with increased chest pain. Mr. Buitrago then requested consent to proceeding to surgery. Our OR team was gone at that point. I drove back to reassess the patient and re-open the OR but upon arrival, he was undergoing CPR. On TTE, we could see a larger pericardial effusion. We attempted pericardiocentesis and extracted 60cc of dark blood with some improvement in effusion but this was likely reaccumulating at a faster rate. At this point, the ER had over 30 min of CPR without any signs of ROSC.     I offered my condolences to the family. Unfortunately, I believe we lost our window of opportunity for surgical intervention when the patient had presented to the ER at 2303.     Anitha Pierson MD   Cardiothoracic Surgery  P: [phone]  C: [phone]

## 2022-04-02 NOTE — ANESTHESIA CARE TRANSFER NOTE
Patient: Steven Buitrago    Procedure: * No procedures listed *       Diagnosis: * No pre-op diagnosis entered *  Diagnosis Additional Information: No value filed.    Anesthesia Type:   No value filed.     Note:    Oropharynx: endotracheal tube in place and ventilatory support  Level of Consciousness: unresponsive      Independent Airway: airway patency not satisfactory and stable  Dentition: dentition unchanged      Patient transferred to: Emergency Department  Comments: Anesthesia Out of OR Intubation     Patient:Steven Buitrago ([id number])  Site: [facility]  The Patient received no drugs for sedation or for Neuromuscular Blockade.  Please see intubation note.    MELA Pandya CRNA 4/2/2022 2:00 AM  Handoff Report: Identifed the Patient, Identified the Reponsible Provider, Reviewed the pertinent medical history, Discussed the surgical course, Reviewed Intra-OP anesthesia mangement and issues during anesthesia, Set expectations for post-procedure period and Allowed opportunity for questions and acknowledgement of understanding      Vitals:  Vitals Value Taken Time   /150 04/02/22 0155   Temp     Pulse 22 04/02/22 0157   Resp     SpO2 62 % 04/02/22 0153   Vitals shown include unvalidated device data.    Electronically Signed By: MELA Pandya CRNA  April 2, 2022  1:59 AM

## 2022-04-02 NOTE — ED TRIAGE NOTES
"Pt c/o CP and SOB onset about 1730. Pt is diaphoretic. C/o 5/10 CP in center chest radiates to back. Was given nitroglycerin SL and 4 baby ASA w/improvement. Pt is smoker. Does not currently take medications. Has previous \"narrowing\" per pt report. From 1996, did not get it taken care of then.     "

## 2022-04-02 NOTE — ED NOTES
Bed: JNED-21  Expected date: 4/1/22  Expected time: 8:52 PM  Means of arrival: Ambulance  Comments:  SPF CP

## 2022-04-03 LAB
ATRIAL RATE - MUSE: 74 BPM
DIASTOLIC BLOOD PRESSURE - MUSE: NORMAL MMHG
INTERPRETATION ECG - MUSE: NORMAL
P AXIS - MUSE: 41 DEGREES
PR INTERVAL - MUSE: 182 MS
QRS DURATION - MUSE: 94 MS
QT - MUSE: 424 MS
QTC - MUSE: 470 MS
R AXIS - MUSE: 26 DEGREES
SYSTOLIC BLOOD PRESSURE - MUSE: NORMAL MMHG
T AXIS - MUSE: 99 DEGREES
VENTRICULAR RATE- MUSE: 74 BPM

## 2022-04-04 LAB
ABO/RH(D): NORMAL
ANTIBODY SCREEN: NEGATIVE
ATRIAL RATE - MUSE: 79 BPM
ATRIAL RATE - MUSE: 80 BPM
DIASTOLIC BLOOD PRESSURE - MUSE: 51 MMHG
DIASTOLIC BLOOD PRESSURE - MUSE: NORMAL MMHG
HBV SURFACE AG SERPL QL IA: NONREACTIVE
HIV 1+2 AB+HIV1 P24 AG SERPL QL IA: NONREACTIVE
INTERPRETATION ECG - MUSE: NORMAL
INTERPRETATION ECG - MUSE: NORMAL
P AXIS - MUSE: 39 DEGREES
P AXIS - MUSE: 41 DEGREES
PR INTERVAL - MUSE: 164 MS
PR INTERVAL - MUSE: 174 MS
QRS DURATION - MUSE: 94 MS
QRS DURATION - MUSE: 96 MS
QT - MUSE: 382 MS
QT - MUSE: 402 MS
QTC - MUSE: 440 MS
QTC - MUSE: 460 MS
R AXIS - MUSE: 31 DEGREES
R AXIS - MUSE: 40 DEGREES
SYSTOLIC BLOOD PRESSURE - MUSE: 84 MMHG
SYSTOLIC BLOOD PRESSURE - MUSE: NORMAL MMHG
T AXIS - MUSE: 148 DEGREES
T AXIS - MUSE: 197 DEGREES
VENTRICULAR RATE- MUSE: 79 BPM
VENTRICULAR RATE- MUSE: 80 BPM

## (undated) RX ORDER — CEFAZOLIN SODIUM 1 G/3ML
INJECTION, POWDER, FOR SOLUTION INTRAMUSCULAR; INTRAVENOUS
Status: DISPENSED
Start: 2022-01-01

## (undated) RX ORDER — FENTANYL CITRATE 50 UG/ML
INJECTION, SOLUTION INTRAMUSCULAR; INTRAVENOUS
Status: DISPENSED
Start: 2022-01-01

## (undated) RX ORDER — HEPARIN SODIUM 1000 [USP'U]/ML
INJECTION, SOLUTION INTRAVENOUS; SUBCUTANEOUS
Status: DISPENSED
Start: 2022-01-01